# Patient Record
Sex: MALE
[De-identification: names, ages, dates, MRNs, and addresses within clinical notes are randomized per-mention and may not be internally consistent; named-entity substitution may affect disease eponyms.]

---

## 2021-06-30 ENCOUNTER — APPOINTMENT (OUTPATIENT)
Dept: OTOLARYNGOLOGY | Facility: CLINIC | Age: 31
End: 2021-06-30
Payer: COMMERCIAL

## 2021-06-30 VITALS
HEIGHT: 67 IN | SYSTOLIC BLOOD PRESSURE: 155 MMHG | DIASTOLIC BLOOD PRESSURE: 105 MMHG | BODY MASS INDEX: 34.06 KG/M2 | HEART RATE: 92 BPM | WEIGHT: 217 LBS | TEMPERATURE: 98.1 F

## 2021-06-30 DIAGNOSIS — H93.8X1 OTHER SPECIFIED DISORDERS OF RIGHT EAR: ICD-10-CM

## 2021-06-30 DIAGNOSIS — H61.21 IMPACTED CERUMEN, RIGHT EAR: ICD-10-CM

## 2021-06-30 DIAGNOSIS — J34.2 DEVIATED NASAL SEPTUM: ICD-10-CM

## 2021-06-30 DIAGNOSIS — Z78.9 OTHER SPECIFIED HEALTH STATUS: ICD-10-CM

## 2021-06-30 DIAGNOSIS — Z83.3 FAMILY HISTORY OF DIABETES MELLITUS: ICD-10-CM

## 2021-06-30 DIAGNOSIS — R09.81 NASAL CONGESTION: ICD-10-CM

## 2021-06-30 DIAGNOSIS — J34.3 HYPERTROPHY OF NASAL TURBINATES: ICD-10-CM

## 2021-06-30 DIAGNOSIS — Z80.9 FAMILY HISTORY OF MALIGNANT NEOPLASM, UNSPECIFIED: ICD-10-CM

## 2021-06-30 PROBLEM — Z00.00 ENCOUNTER FOR PREVENTIVE HEALTH EXAMINATION: Status: ACTIVE | Noted: 2021-06-30

## 2021-06-30 PROCEDURE — 69210 REMOVE IMPACTED EAR WAX UNI: CPT | Mod: RT

## 2021-06-30 PROCEDURE — 31231 NASAL ENDOSCOPY DX: CPT

## 2021-06-30 PROCEDURE — 99072 ADDL SUPL MATRL&STAF TM PHE: CPT

## 2021-06-30 PROCEDURE — 99204 OFFICE O/P NEW MOD 45 MIN: CPT | Mod: 25

## 2021-06-30 RX ORDER — FLUTICASONE PROPIONATE 50 UG/1
50 SPRAY, METERED NASAL
Refills: 0 | Status: ACTIVE | COMMUNITY

## 2021-07-02 PROBLEM — R09.81 CHRONIC NASAL CONGESTION: Status: ACTIVE | Noted: 2021-07-02

## 2021-07-02 NOTE — ASSESSMENT
[FreeTextEntry1] : Mr. MEJIA was evaluated for the following issues today: \par \par 1.) Right ear clogging relieved with cerumen impaction removal\par 2.) Deviated nasal septum and turbinate hypertrophy cause his chronic nasal congestion\par --> I recommend septoplasty and turbinate reduction\par Risks, benefits and alternatives were discussed with patient.\par Risks include, but are not limited to, bleeding, infection, septal hematoma or abscess, septal perforation, intranasal scarring, persistent sx and need for further treatment.   \par He is aware that surgery does not treat allergy.\par His questions were answered.  \par \par He will return if he has additional questions or wants to schedule surgery with me.\par \par

## 2021-07-02 NOTE — HISTORY OF PRESENT ILLNESS
[de-identified] : Mr. MEJIA is a 31 year old man who presents  for ear pressure and nasal congestion.\par \par Chronic right nasal congestion/obstruction x a few years. Sometimes congestion can affect left side a little.\par Used fluticasone nasal spray BID that improves breathing a little for over 1 month.  Also uses saline solution.  \par He has pollen allergy.  Nasal trauma but no fx.  Brother recently had satisfactory septal repair.\par \par Right ear pressure intermittent x 2 years.  Has to constantly pop the ear.\par No HL, tinnitus, dizziness.\par

## 2021-07-02 NOTE — CONSULT LETTER
[Dear  ___] : Dear  [unfilled], [FreeTextEntry1] : JAIR DEVI is a 89 year old male here at the request of  for evaluation of aortic stenosis and candidacy for RAMONA procedure.  To review, he resides at [facility] and has a PMH of CAD, HF, COPD, prostate cancer s/p radiation therapy, CKD, anemia, AMD, ocular HTN, cataracts, HTN, and aortic valve disorder. He comes in today reporting\par  [Courtesy Letter:] : I had the pleasure of seeing your patient, [unfilled], in my office today. [Please see my note below.] : Please see my note below. [Sincerely,] : Sincerely, [FreeTextEntry2] : [facility]\par [address]\par [place] [id number] [FreeTextEntry3] : \par Josy Aranda MD \par Otolaryngology, Head and Neck Surgery \par \par

## 2021-07-02 NOTE — REVIEW OF SYSTEMS
[Patient Intake Form Reviewed] : Patient intake form was reviewed [Ear Pain] : ear pain [Ear Drainage] : ear drainage [As Noted in HPI] : as noted in HPI [Nasal Congestion] : nasal congestion [Sinus Pain] : sinus pain [Dry Eyes] : dry eyes [Eyes Itch] : itching of the eyes [Anxiety] : anxiety [Negative] : Heme/Lymph [de-identified] : hay fever [FreeTextEntry2] : night sweats [FreeTextEntry9] : back pain [de-identified] : headache

## 2021-07-02 NOTE — PROCEDURE
[FreeTextEntry6] : \par Indication:  deviated nasal septum \par -Verbal consent was obtained from patient prior to exam. \par - Parish-Synephrine and lidocaine 2% spray applied to nose bilaterally.\par Nasal endoscopy was performed with flexible scope.\par Findings: \par -- Inferior turbinates hypertrophy Left > Right.  Inferior meatus clear bilateral.\par -- Septum was deviated to the right anteriorly more than posterior; touches right inferior turbinate even after decongestion.  Left nasal passage is clear.\par -- No polyps either side nose\par -- Mucus clear bilateral\par -- Middle and superior turbinates normal bilateral\par -- Middle meatus clear bilateral.  SER clear bilateral.\par -- Nasopharynx without mass or exudate\par -- Adenoids were absent  \par -- Eustachian orifices were clear bilateral\par \par The patient tolerated the procedure well.\par

## 2021-07-02 NOTE — PHYSICAL EXAM
[Nasal Endoscopy Performed] : nasal endoscopy was performed, see procedure section for findings [] : septum deviated to the right [Midline] : trachea located in midline position [Normal] : palatine tonsils are normal [FreeTextEntry1] : No hoarseness.  [de-identified] : Right cerumen impaction removed with  suction after H2O2 instillation.  Left EAC clear. [de-identified] : No significant change in breathing with Thad maneuver [de-identified] : 2+ [de-identified] : Carotid pulses 2+ bilateral.

## 2021-08-24 ENCOUNTER — TRANSCRIPTION ENCOUNTER (OUTPATIENT)
Age: 31
End: 2021-08-24